# Patient Record
(demographics unavailable — no encounter records)

---

## 2025-07-21 NOTE — PHYSICAL EXAM
[General Appearance - In No Acute Distress] : no acute distress [] : no respiratory distress [Abdomen Soft] : soft [Urethral Meatus] : meatus normal [Penis Abnormality] : normal circumcised penis [Testes Tenderness] : no tenderness of the testes [Testes Mass (___cm)] : there were no testicular masses [No Focal Deficits] : no focal deficits [Oriented To Time, Place, And Person] : oriented to person, place, and time

## 2025-07-22 NOTE — ASSESSMENT
[FreeTextEntry1] : 68-year-old male presents with BPH and LUTS, prostate size 105 cc. Taking tamsulosin 0.4 mg still with severe symptoms.   We discussed different therapeutic options including TURP, PVP, HoLEP, Aquablation, Urolift therapy, and Rezum in addition to full continuation of medical therapy. Discussed the issues of incontinence, retrograde ejaculation, erectile dysfunction, irritative voiding symptoms, injury to urethra and bladder, persistence of irritative voiding symptoms, urethral stricture or bladder neck contracture, need for open surgery to repair the injury, erectile dysfunction and infertility.  I made it clear that because of his large prostate size > 80 grams, AUA recommendation is to perform a simple prostatectomy due to the ability to obtain effective and durable improvement in voiding symptoms. A simple prostatectomy can be performed via an open approach, laparoscopic approach or transurethral laser enucleation approach. We discussed the risks and benefits of each approach. Overall, the long term outcomes are similar. However, the laser enucleation procedure has the least morbidity with quickest recovery of the three options. Therefore, I have recommended laser enucleation of the prostate performed via a transurethral approach.  ARLEEN JAQUEZ decided to proceed with laser enucleation of prostate followed by prostate morcellation. Therefore, I spent a good amount of time discussing the risks and benefits of this procedure. We discussed potential risks of incontinence, retrograde ejaculation and infertility, erectile dysfunction, irritative voiding symptoms, injury to the urethra and bladder, rare need to convert to an open surgery.  - HoLEP at [facility] on 10/2/25 - Medical clearance with PCP - Pre-op labs  - Continue tamsulosin 0.4 mg for now

## 2025-07-22 NOTE — LETTER BODY
[Dear  ___] : Dear  [unfilled], [Courtesy Letter:] : I had the pleasure of seeing your patient, [unfilled], in my office today. [Please see my note below.] : Please see my note below. [Consult Closing:] : Thank you very much for allowing me to participate in the care of this patient.  If you have any questions, please do not hesitate to contact me. [Sincerely,] : Sincerely, [FreeTextEntry3] : Micheline Pelaez MD

## 2025-07-22 NOTE — ASSESSMENT
[FreeTextEntry1] : 68-year-old male presents with BPH and LUTS, prostate size 105 cc. Taking tamsulosin 0.4 mg still with severe symptoms.   We discussed different therapeutic options including TURP, PVP, HoLEP, Aquablation, Urolift therapy, and Rezum in addition to full continuation of medical therapy. Discussed the issues of incontinence, retrograde ejaculation, erectile dysfunction, irritative voiding symptoms, injury to urethra and bladder, persistence of irritative voiding symptoms, urethral stricture or bladder neck contracture, need for open surgery to repair the injury, erectile dysfunction and infertility.  I made it clear that because of his large prostate size > 80 grams, AUA recommendation is to perform a simple prostatectomy due to the ability to obtain effective and durable improvement in voiding symptoms. A simple prostatectomy can be performed via an open approach, laparoscopic approach or transurethral laser enucleation approach. We discussed the risks and benefits of each approach. Overall, the long term outcomes are similar. However, the laser enucleation procedure has the least morbidity with quickest recovery of the three options. Therefore, I have recommended laser enucleation of the prostate performed via a transurethral approach.  ARLEEN JAQUEZ decided to proceed with laser enucleation of prostate followed by prostate morcellation. Therefore, I spent a good amount of time discussing the risks and benefits of this procedure. We discussed potential risks of incontinence, retrograde ejaculation and infertility, erectile dysfunction, irritative voiding symptoms, injury to the urethra and bladder, rare need to convert to an open surgery.  - HoLEP at [facility] on 10/2/25 - Medical clearance with PCP - Pre-op labs  - Continue tamsulosin 0.4 mg for now

## 2025-07-22 NOTE — ASSESSMENT
[FreeTextEntry1] : 68-year-old male presents with BPH and LUTS, prostate size 105 cc. Taking tamsulosin 0.4 mg still with severe symptoms.   We discussed different therapeutic options including TURP, PVP, HoLEP, Aquablation, Urolift therapy, and Rezum in addition to full continuation of medical therapy. Discussed the issues of incontinence, retrograde ejaculation, erectile dysfunction, irritative voiding symptoms, injury to urethra and bladder, persistence of irritative voiding symptoms, urethral stricture or bladder neck contracture, need for open surgery to repair the injury, erectile dysfunction and infertility.  I made it clear that because of his large prostate size > 80 grams, AUA recommendation is to perform a simple prostatectomy due to the ability to obtain effective and durable improvement in voiding symptoms. A simple prostatectomy can be performed via an open approach, laparoscopic approach or transurethral laser enucleation approach. We discussed the risks and benefits of each approach. Overall, the long term outcomes are similar. However, the laser enucleation procedure has the least morbidity with quickest recovery of the three options. Therefore, I have recommended laser enucleation of the prostate performed via a transurethral approach.  ARLEEN JAQUEZ decided to proceed with laser enucleation of prostate followed by prostate morcellation. Therefore, I spent a good amount of time discussing the risks and benefits of this procedure. We discussed potential risks of incontinence, retrograde ejaculation and infertility, erectile dysfunction, irritative voiding symptoms, injury to the urethra and bladder, rare need to convert to an open surgery.  - HoLEP at [facility] on 10/2/25 - Medical clearance with PCP - Pre-op labs  - Continue tamsulosin 0.4 mg for now

## 2025-07-22 NOTE — HISTORY OF PRESENT ILLNESS
[FreeTextEntry1] : 68-year-old male with BPH/LUTS presents to discuss HoLEP. Referred by Dr. Frazier. He has long-standing, bothersome LUTS despite medical therapy. He also has incomplete bladder emptying.  Hx elevated PSAs with negative MRI on 6/2025.   His symptoms are frequency, urgency, urge incontinence, slow stream, hesitancy.   PSA Hx: 8/20/22-- 2.3; 2/3/24-- 2.5; 3/2025-- 6.0   Prostate size-- 106 cc   IPSS: 27/6 PVR: 174 ml (LUTS)